# Patient Record
Sex: MALE | Race: WHITE | Employment: FULL TIME | RURAL
[De-identification: names, ages, dates, MRNs, and addresses within clinical notes are randomized per-mention and may not be internally consistent; named-entity substitution may affect disease eponyms.]

---
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
## 2020-12-08 NOTE — LETTER
NOTIFICATION RETURN TO WORK / SCHOOL 
 
2/16/2018 8:05 AM 
 
Mr. Abilio Welch [address] To Whom It May Concern: Abilio Welch is currently under the care of Chris Capone. He was ill from 2/12/18 to 2/16/18 He will return to work/school on: 2/19/18 If there are questions or concerns please have the patient contact our office. Sincerely, Maxwell Vital NP 
 
                                
 

Adequate: hears normal conversation without difficulty

## 2021-07-30 NOTE — TELEPHONE ENCOUNTER
Family Medicine Office Visit    CHIEF COMPLAINT:  STI screening    HISTORY OF PRESENT ILLNESS:  This is a 16-year-old female who presents today for STI screening.  She has no active symptoms of vaginal discharge, dysuria, sores or lesions, but does have a new partner and would like screening for gonorrhea, chlamydia, and Trichomonas.  She declines any testing for HIV, syphilis, hepatitis at this time.    PAST MEDICAL HISTORY:    Pyloric stenosis                                              Anxiety                                                         Comment: sees counselor    RSV (acute bronchiolitis due to respiratory sy*               Digital nerve laceration, finger                7/10/2014     Depression                                      01/05/2015    PTSD (post-traumatic stress disorder)                         ADHD (attention deficit hyperactivity disorder*             ALLERGIES:  No Known Allergies  Social History     Socioeconomic History   • Marital status: Single     Spouse name: Not on file   • Number of children: Not on file   • Years of education: Not on file   • Highest education level: Not on file   Occupational History   • Occupation: student   Tobacco Use   • Smoking status: Never Smoker   • Smokeless tobacco: Never Used   Substance and Sexual Activity   • Alcohol use: No   • Drug use: No   • Sexual activity: Not on file   Other Topics Concern   • Not on file   Social History Narrative   • Not on file     Social Determinants of Health     Financial Resource Strain:    • Social Determinants: Financial Resource Strain:    Food Insecurity:    • Social Determinants: Food Insecurity:    Transportation Needs:    • Lack of Transportation (Medical):    • Lack of Transportation (Non-Medical):    Physical Activity:    • Days of Exercise per Week:    • Minutes of Exercise per Session:    Stress:    • Social Determinants: Stress:    Social Connections:    • Social Determinants: Social Connections:   Mother called, Dor[facility], they were concerned about his WBC level being low, he is seeing Dr. Maira Gutierrez at [facility] and is being scheduled to see Pediatric Hematologist next week.   Intimate Partner Violence:    • Social Determinants: Intimate Partner Violence Past Fear:    • Social Determinants: Intimate Partner Violence Current Fear:        Meds/Allergies/PMH/SH/FH reviewed in chart     PHYSICAL EXAMINATION:   Visit Vitals  /74   Pulse 98   Temp 98.9 °F (37.2 °C) (Tympanic)   Wt 67.9 kg   LMP 07/07/2021   SpO2 99%     General:  Well-developed, well-nourished, no acute distress.  Skin: Warm, no rashes or lesions.   Eyes: Normal lids, conjunctivae clear bilaterally.   Neuro: Alert and oriented x 3. Normal gait without ataxia.     ASSESSMENT:   1. STI screening    PLAN:  Screening for gonorrhea, chlamydia, Trichomonas ordered.  Patient declines any further STI testing at this time, though this was offered to her.  She is asymptomatic.  Will call her with results when made available.    Patient indicated understanding and agreement with this plan of care. All patient questions were answered.    The patient was seen and examined by me, Marybel Tinoco PA-C, under the supervision of Dr. Isela Kaplan.   Marybel Tinoco PA-C

## 2021-12-03 ENCOUNTER — OFFICE VISIT (OUTPATIENT)
Dept: FAMILY MEDICINE CLINIC | Age: 21
End: 2021-12-03
Payer: COMMERCIAL

## 2021-12-03 VITALS
DIASTOLIC BLOOD PRESSURE: 76 MMHG | WEIGHT: 155.4 LBS | HEART RATE: 85 BPM | OXYGEN SATURATION: 99 % | TEMPERATURE: 97.2 F | BODY MASS INDEX: 20.6 KG/M2 | SYSTOLIC BLOOD PRESSURE: 110 MMHG | RESPIRATION RATE: 18 BRPM | HEIGHT: 73 IN
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
## 2021-12-03 DIAGNOSIS — S61.451A OPEN BITE OF RIGHT HAND WITHOUT FOREIGN BODY, INITIAL ENCOUNTER: ICD-10-CM

## 2021-12-03 PROCEDURE — 99212 OFFICE O/P EST SF 10 MIN: CPT | Performed by: INTERNAL MEDICINE

## 2021-12-03 RX ORDER — AMOXICILLIN AND CLAVULANATE POTASSIUM 500; 125 MG/1; MG/1
1 TABLET, FILM COATED ORAL 2 TIMES DAILY
Qty: 10 TABLET | Refills: 1 | Status: SHIPPED | OUTPATIENT
Start: 2021-12-03 | End: 2021-12-13

## 2021-12-03 NOTE — PROGRESS NOTES
Ananya Olguin is a 24 y.o. male presenting for/with:    Chief Complaint   Patient presents with    Dog Bite     (L) hand bite. x2 small abrasions. Last TDAP 2020       Visit Vitals  /76 (BP 1 Location: Left upper arm, BP Patient Position: Sitting, BP Cuff Size: Adult)   Pulse 85   Temp 97.2 °F (36.2 °C) (Temporal)   Resp 18   Ht 6' 1\" (1.854 m)   Wt 155 lb 6.4 oz (70.5 kg)   SpO2 99%   BMI 20.50 kg/m²     Pain Scale: 0 - No pain/10  Pain Location:     1. Have you been to the ER, urgent care clinic since your last visit? Hospitalized since your last visit? NO    2. Have you seen or consulted any other health care providers outside of the [address] [place] [address] since your last visit? Include any pap smears or colon screening. NO    Symptom review:  NO  Fever   NO  Shaking chills  NO  Cough  NO  Body aches  NO  Coughing up blood  NO  Chest congestion  NO  Chest pain  NO  Shortness of breath  NO  Profound Loss of smell/taste  NO  Nausea/Vomiting   NO  Loose stool/Diarrhea  NO  any skin issues    Patient Risk Factors Reviewed as follows:  NO  have you been in Close contact with confirmed COVID19 patient   NO  History of recent travel to affected geographical areas within the past 14 days  NO  COPD  NO  Active Cancer/Leukemia/Lymphoma/Chemotherapy  NO  Oral steroid use  NO  Pregnant  NO  Diabetes Mellitus  NO  Heart disease  NO  Asthma  NO Health care worker at home  NO Health care worker  NO Is there a Pregnant Woman in the home  NO Dialysis pt in the home   NO a large number of people living in the home    Learning Assessment 12/3/2021   PRIMARY LEARNER Patient   PRIMARY LANGUAGE ENGLISH   LEARNER PREFERENCE PRIMARY DEMONSTRATION   ANSWERED BY patient   RELATIONSHIP SELF     Fall Risk Assessment, last 12 mths 12/3/2021   Able to walk? Yes   Fall in past 12 months? 0   Do you feel unsteady?  0   Are you worried about falling 0       3 most recent PHQ Screens 12/3/2021   PHQ Not Done -   Little interest or pleasure in doing things Not at all   Feeling down, depressed, irritable, or hopeless Not at all   Total Score PHQ 2 0     Abuse Screening Questionnaire 12/3/2021   Do you ever feel afraid of your partner? N   Are you in a relationship with someone who physically or mentally threatens you? N   Is it safe for you to go home?  Y       ADL Assessment 12/3/2021   Feeding yourself No Help Needed   Getting from bed to chair No Help Needed   Getting dressed No Help Needed   Bathing or showering No Help Needed   Walk across the room (includes cane/walker) No Help Needed   Using the telphone No Help Needed   Taking your medications No Help Needed   Preparing meals No Help Needed   Managing money (expenses/bills) No Help Needed   Moderately strenuous housework (laundry) No Help Needed   Shopping for personal items (toiletries/medicines) No Help Needed   Shopping for groceries No Help Needed   Driving No Help Needed   Climbing a flight of stairs No Help Needed   Getting to places beyond walking distances No Help Needed      Advance Care Planning 12/3/2021   Patient's Healthcare Decision Maker is: Legal Next of Kin   Confirm Advance Directive None   Patient Would Like to Complete Advance Directive No

## 2021-12-03 NOTE — PROGRESS NOTES
Chief Complaint   Patient presents with    Dog Bite     (L) hand bite. x2 small abrasions. Last TDAP 2020       ASSESSMENT AND PLAN:    1. Dog bite, initial encounter  Tetanus is up-to-date. Will begin Augmentin 500 mg twice a day for 5 days. Follow-up if symptoms intensify      No orders of the defined types were placed in this encounter. Harprete Ling MD, FACP      HPI:        Rocky Alford is a 24 y.o. male who notes the onset of a skin problem. The details are as follows: Andrey picked up his injured dog today who reflexively bit him on the hand. His tetanus shot is up-to-date. Allergies   Allergen Reactions    Nectarine Angioedema     Throat swelling       Current Outpatient Medications   Medication Sig    inFLIXimab (REMICADE) 100 mg injection 5 mg/kg by IntraVENous route once. Indications: CROHN'S DISEASE     No current facility-administered medications for this visit. Past Medical History:   Diagnosis Date    Autoimmune disease ([facility] [address]     Crohn's Disease    GERD (gastroesophageal reflux disease)     Other ill-defined conditions(799.89)     concave pectis excavatun    Unspecified adverse effect of anesthesia     orthostatic hypotension    Ventricular septal defect     as infant         ROS:  Denies fever, chills, cough, chest pain, SOB,  nausea, vomiting, or diarrhea. Denies wt loss, wt gain, hemoptysis, hematochezia or melena. Physical Examination:    Visit Vitals  /76 (BP 1 Location: Left upper arm, BP Patient Position: Sitting, BP Cuff Size: Adult)   Pulse 85   Temp 97.2 °F (36.2 °C) (Temporal)   Resp 18   Ht 6' 1\" (1.854 m)   Wt 155 lb 6.4 oz (70.5 kg)   SpO2 99%   BMI 20.50 kg/m²      General:  Alert, cooperative, no distress. Head:  Normocephalic, without obvious abnormality, atraumatic. Eyes:  Conjunctivae/corneas clear. Pupils equal, round, reactive to light. Chest wall:  No tenderness or deformity.    Extremities: Extremities normal, atraumatic, no cyanosis or edema.    Skin:             Lymph nodes: Cervical and supraclavicular nodes are normal.   Neurologic: Moves all extremities, fluent speech

## 2022-03-19 PROBLEM — M70.32 BURSITIS OF LEFT ELBOW: Status: ACTIVE | Noted: 2017-02-08

## 2022-03-19 PROBLEM — D84.9 IMMUNOSUPPRESSION (HCC): Status: ACTIVE | Noted: 2017-01-23

## 2022-09-29 ENCOUNTER — OFFICE VISIT (OUTPATIENT)
Dept: FAMILY MEDICINE CLINIC | Age: 22
End: 2022-09-29

## 2022-09-29 VITALS — TEMPERATURE: 98.7 F | HEART RATE: 89 BPM | OXYGEN SATURATION: 96 % | RESPIRATION RATE: 17 BRPM

## 2022-09-29 DIAGNOSIS — R09.81 SINUS CONGESTION: ICD-10-CM

## 2022-09-29 DIAGNOSIS — U07.1 COVID-19: ICD-10-CM

## 2022-09-29 DIAGNOSIS — J02.9 SORE THROAT: Primary | ICD-10-CM

## 2022-09-29 LAB
EXP DATE SOLUTION: ABNORMAL
EXP DATE SWAB: ABNORMAL
LOT NUMBER SOLUTION: ABNORMAL
LOT NUMBER SWAB: ABNORMAL
S PYO AG THROAT QL: NEGATIVE
SARS-COV-2 RNA POC: POSITIVE
VALID INTERNAL CONTROL?: YES

## 2022-09-29 PROCEDURE — 99213 OFFICE O/P EST LOW 20 MIN: CPT | Performed by: INTERNAL MEDICINE

## 2022-09-29 PROCEDURE — 87635 SARS-COV-2 COVID-19 AMP PRB: CPT | Performed by: INTERNAL MEDICINE

## 2022-09-29 PROCEDURE — 87880 STREP A ASSAY W/OPTIC: CPT | Performed by: INTERNAL MEDICINE

## 2022-09-29 NOTE — PROGRESS NOTES
Chief Complaint   Patient presents with    Cold Symptoms     C/O sick symptoms since returning from [facility] Sunday. C/O cough, congestion, headache, tired eyes, sore throat, fatigue, generalized fatigue. No home test.        ASSESSMENT AND PLAN:    1. Sore throat  - AMB POC COVID-19 COV  - AMB POC RAPID STREP A    2. Sinus congestion  - AMB POC COVID-19 COV  - AMB POC RAPID STREP A    3. COVID-19  Risks, benefits and alternatives to antiviral therapy and currently he is several days out from the initiation of his illness. We have mutually elected not to pursue antiviral therapy at this time but noted that he would return immediately or go to the ER if his symptoms worsen. He has no worries some indications of serious illness at this time. He is not tachycardic nor hypoxic. It is acknowledged that he is immunocompromised by virtue of his use of Rituxan. Watch carefully. Orders Placed This Encounter    AMB POC COVID-19 COV     Order Specific Question:   Is this test for diagnosis or screening? Answer:   Diagnosis of ill patient     Order Specific Question:   Symptomatic for COVID-19 as defined by CDC? Answer:   Yes     Order Specific Question:   Date of Symptom Onset     Answer:   9/25/2022     Order Specific Question:   Hospitalized for COVID-19? Answer:   No     Order Specific Question:   Admitted to ICU for COVID-19? Answer:   No     Order Specific Question:   Employed in healthcare setting? Answer:   No     Order Specific Question:   Resident in a congregate (group) care setting? Answer:   No     Order Specific Question:   Previously tested for COVID-19? Answer:   No    AMB POC RAPID STREP A       Kristi Durbin MD, FACP      HPI:        Jeny Morris is a 20 y.o. male has been ill for about 6 days with cough and a sore throat. No shortness of breath or difficulty breathing. COVID test today in the parking lot is positive.       Allergies   Allergen Reactions    Nectarine Angioedema     Throat swelling       Current Outpatient Medications   Medication Sig    inFLIXimab (REMICADE) 100 mg injection 5 mg/kg by IntraVENous route once. Indications: CROHN'S DISEASE     No current facility-administered medications for this visit. Past Medical History:   Diagnosis Date    Autoimmune disease ([facility] [address]     Crohn's Disease    GERD (gastroesophageal reflux disease)     Other ill-defined conditions(799.89)     concave pectis excavatun    Unspecified adverse effect of anesthesia     orthostatic hypotension    Ventricular septal defect     as infant         ROS:  Denies fever, chills, cough, chest pain, SOB,  nausea, vomiting, or diarrhea. Denies wt loss, wt gain, hemoptysis, hematochezia or melena. Physical Examination:    Visit Vitals  Pulse 89   Temp 98.7 °F (37.1 °C) (Oral)   Resp 17   SpO2 96%      General:  Alert, cooperative, no distress. Head:  Normocephalic, without obvious abnormality, atraumatic. Eyes:  Conjunctivae/corneas clear. Pupils equal, round, reactive to light. Chest wall:  No tenderness or deformity. Respirations are unlabored. Speech is fluent. Lungs are clear in all fields. Cardiac exam is unremarkable. Extremities: Extremities normal, atraumatic, no cyanosis or edema.    Skin:  No rash   Lymph nodes: Cervical and supraclavicular nodes are normal.   Neurologic: Moves all extremities, fluent speech

## 2022-09-29 NOTE — PROGRESS NOTES
Nelly Kearney is a 20 y.o. male presenting for/with:    Chief Complaint   Patient presents with    Cold Symptoms     C/O sick symptoms since returning from [facility] Sunday. C/O cough, congestion, headache, tired eyes, sore throat, fatigue, generalized fatigue. No home test.        Visit Vitals  Pulse 89   Temp 98.7 °F (37.1 °C) (Oral)   Resp 17   SpO2 96%     Pain Scale: 1/10  Pain Location: Generalized    1. \"Have you been to the ER, urgent care clinic since your last visit? Hospitalized since your last visit? \" No    2. \"Have you seen or consulted any other health care providers outside of the [address] [place] [address] since your last visit? \" No     3. For patients aged 39-70: Has the patient had a colonoscopy / FIT/ Cologuard? NA - based on age      If the patient is female:    4. For patients aged 41-77: Has the patient had a mammogram within the past 2 years? NA - based on age or sex      11. For patients aged 21-65: Has the patient had a pap smear? NA - based on age or sex      Learning Assessment 12/3/2021   PRIMARY LEARNER Patient   PRIMARY LANGUAGE ENGLISH   LEARNER PREFERENCE PRIMARY DEMONSTRATION   ANSWERED BY patient   RELATIONSHIP SELF     Fall Risk Assessment, last 12 mths 12/3/2021   Able to walk? Yes   Fall in past 12 months? 0   Do you feel unsteady? 0   Are you worried about falling 0       3 most recent PHQ Screens 9/29/2022   PHQ Not Done -   Little interest or pleasure in doing things Not at all   Feeling down, depressed, irritable, or hopeless Not at all   Total Score PHQ 2 0     Abuse Screening Questionnaire 12/3/2021   Do you ever feel afraid of your partner? N   Are you in a relationship with someone who physically or mentally threatens you? N   Is it safe for you to go home?  Y       ADL Assessment 12/3/2021   Feeding yourself No Help Needed   Getting from bed to chair No Help Needed   Getting dressed No Help Needed   Bathing or showering No Help Needed   Walk across the room (includes cane/walker) No Help Needed   Using the telphone No Help Needed   Taking your medications No Help Needed   Preparing meals No Help Needed   Managing money (expenses/bills) No Help Needed   Moderately strenuous housework (laundry) No Help Needed   Shopping for personal items (toiletries/medicines) No Help Needed   Shopping for groceries No Help Needed   Driving No Help Needed   Climbing a flight of stairs No Help Needed   Getting to places beyond walking distances No Help Needed      Advance Care Planning 12/3/2021   Patient's Healthcare Decision Maker is: Legal Next of Kin   Confirm Advance Directive None   Patient Would Like to Complete Advance Directive No      Results for orders placed or performed in visit on 09/29/22   AMB POC COVID-19 COV   Result Value Ref Range    SARS-COV-2 RNA POC Positive (A) Negative    LOT NUMBER SWAB [id number]     EXP DATE SWAB 2/28/25     LOT NUMBER SOLUTION [id number]     EXP DATE SOLUTION 5/2/23    AMB POC RAPID STREP A   Result Value Ref Range    VALID INTERNAL CONTROL POC Yes     Group A Strep Ag Negative Negative

## 2022-12-02 ENCOUNTER — OFFICE VISIT (OUTPATIENT)
Dept: FAMILY MEDICINE CLINIC | Age: 22
End: 2022-12-02
Payer: COMMERCIAL

## 2022-12-02 VITALS
RESPIRATION RATE: 16 BRPM | SYSTOLIC BLOOD PRESSURE: 110 MMHG | BODY MASS INDEX: 21.89 KG/M2 | HEIGHT: 73 IN | TEMPERATURE: 98 F | HEART RATE: 74 BPM | WEIGHT: 165.2 LBS | DIASTOLIC BLOOD PRESSURE: 68 MMHG | OXYGEN SATURATION: 97 %

## 2022-12-02 DIAGNOSIS — H10.31 ACUTE BACTERIAL CONJUNCTIVITIS OF RIGHT EYE: Primary | ICD-10-CM

## 2022-12-02 PROCEDURE — 99213 OFFICE O/P EST LOW 20 MIN: CPT | Performed by: NURSE PRACTITIONER

## 2022-12-02 RX ORDER — ERYTHROMYCIN 5 MG/G
OINTMENT OPHTHALMIC
Qty: 3.5 G | Refills: 0 | Status: SHIPPED | OUTPATIENT
Start: 2022-12-02

## 2022-12-02 NOTE — PROGRESS NOTES
Scott Macais is a 20 y.o. male presenting for/with:    Chief Complaint   Patient presents with    Eye Problem     States yesterday his R eye felt a little irritated and woke up this morning with it completely swollen shut . . states some drainage but no pain        Visit Vitals  /68 (BP 1 Location: Right arm, BP Patient Position: Sitting)   Pulse 74   Temp 98 °F (36.7 °C) (Temporal)   Resp 16   Ht 6' 1\" (1.854 m)   Wt 165 lb 3.2 oz (74.9 kg)   SpO2 97%   BMI 21.80 kg/m²     Pain Scale: 0 - No pain/10  Pain Location:     1. \"Have you been to the ER, urgent care clinic since your last visit? Hospitalized since your last visit? \" No    2. \"Have you seen or consulted any other health care providers outside of the [address] [place] [address] since your last visit? \" No     3. For patients aged 39-70: Has the patient had a colonoscopy / FIT/ Cologuard? No      If the patient is female:    4. For patients aged 41-77: Has the patient had a mammogram within the past 2 years? NA - based on age or sex      11. For patients aged 21-65: Has the patient had a pap smear? NA - based on age or sex          Patient    Learning Assessment 12/3/2021   PRIMARY LEARNER Patient   PRIMARY LANGUAGE ENGLISH   LEARNER PREFERENCE PRIMARY DEMONSTRATION   ANSWERED BY patient   RELATIONSHIP SELF     Fall Risk Assessment, last 12 mths 12/3/2021   Able to walk? Yes   Fall in past 12 months? 0   Do you feel unsteady? 0   Are you worried about falling 0       3 most recent PHQ Screens 12/2/2022   PHQ Not Done -   Little interest or pleasure in doing things Not at all   Feeling down, depressed, irritable, or hopeless Not at all   Total Score PHQ 2 0     Abuse Screening Questionnaire 12/3/2021   Do you ever feel afraid of your partner? N   Are you in a relationship with someone who physically or mentally threatens you? N   Is it safe for you to go home?  Y       ADL Assessment 12/3/2021   Feeding yourself No Help Needed   Getting from bed to chair No Help Needed   Getting dressed No Help Needed   Bathing or showering No Help Needed   Walk across the room (includes cane/walker) No Help Needed   Using the telphone No Help Needed   Taking your medications No Help Needed   Preparing meals No Help Needed   Managing money (expenses/bills) No Help Needed   Moderately strenuous housework (laundry) No Help Needed   Shopping for personal items (toiletries/medicines) No Help Needed   Shopping for groceries No Help Needed   Driving No Help Needed   Climbing a flight of stairs No Help Needed   Getting to places beyond walking distances No Help Needed      Advance Care Planning 12/3/2021   Patient's Healthcare Decision Maker is: Legal Next of Kin   Confirm Advance Directive None   Patient Would Like to Complete Advance Directive No

## 2022-12-02 NOTE — PROGRESS NOTES
Chief Complaint   Patient presents with    Eye Problem     States yesterday his R eye felt a little irritated and woke up this morning with it completely swollen shut . . states some drainage but no pain          HPI:      Harmeet Palma is a 20 y.o. male. New Issues:  He is here for right eye swelling. Felt irritated yesterday. This morning, his eye was almost swollen shut. Denies chance of foreign body. Some yellow crusting this morning. No involvement of the left eye. No sick symptoms. Allergies   Allergen Reactions    Nectarine Angioedema     Throat swelling       Current Outpatient Medications   Medication Sig    erythromycin (ILOTYCIN) ophthalmic ointment Apply 1 cm to the inside of the lower lid 4x per day for 7 days    inFLIXimab (REMICADE) 100 mg injection 5 mg/kg by IntraVENous route once. Indications: CROHN'S DISEASE     No current facility-administered medications for this visit. Past Medical History:   Diagnosis Date    Autoimmune disease ([facility] [address]     Crohn's Disease    GERD (gastroesophageal reflux disease)     Other ill-defined conditions(799.89)     concave pectis excavatun    Unspecified adverse effect of anesthesia     orthostatic hypotension    Ventricular septal defect     as infant       Past Surgical History:   Procedure Laterality Date    HX TONSILLECTOMY  age 1    adenoid       Social History     Socioeconomic History    Marital status: SINGLE   Tobacco Use    Smoking status: Never    Smokeless tobacco: Never   Substance and Sexual Activity    Alcohol use: Yes     Alcohol/week: 0.0 standard drinks    Drug use: No    Sexual activity: Yes   Social History Narrative    ** Merged History Encounter **            Family History   Problem Relation Age of Onset    Hypertension Father     Diabetes Father        Above history reviewed. ROS:  Denies fever, chills, cough, chest pain, SOB,  nausea, vomiting, or diarrhea.   Denies wt loss, wt gain, hemoptysis, hematochezia or melena. Physical Examination:    /68 (BP 1 Location: Right arm, BP Patient Position: Sitting)   Pulse 74   Temp 98 °F (36.7 °C) (Temporal)   Resp 16   Ht 6' 1\" (1.854 m)   Wt 165 lb 3.2 oz (74.9 kg)   SpO2 97%   BMI 21.80 kg/m²     General: Alert and Ox3, Fluent speech  HEENT:  PERRLA, EOM intact, right upper eyelid with soft tissue swelling and erythema, right conjunctiva erythematous without foreign body. No thyromegaly. No cervical adenopathy. Neck:  Supple, no adenopathy, JVD, mass or bruit  Chest:  Clear to Ausculation, without wheezes, rales, rubs or ronchi  Cardiac: RRR  Extremities:  No cyanosis, clubbing or edema  Neurologic:  Ambulatory without assist, CN 2-12 grossly intact. Moves all extremities. Skin: no rash  Lymphadenopathy: no cervical or supraclavicular nodes    ASSESSMENT AND PLAN:     1. Acute bacterial conjunctivitis of right eye  Avoid touching eye  Start Erythromycin  Present to eye specialist if not improved by Monday  - erythromycin (ILOTYCIN) ophthalmic ointment;  Apply 1 cm to the inside of the lower lid 4x per day for 7 days  Dispense: 3.5 g; Refill: 0    RTC PRN    Raissa Foley NP